# Patient Record
Sex: MALE | Race: BLACK OR AFRICAN AMERICAN | URBAN - METROPOLITAN AREA
[De-identification: names, ages, dates, MRNs, and addresses within clinical notes are randomized per-mention and may not be internally consistent; named-entity substitution may affect disease eponyms.]

---

## 2020-06-29 ENCOUNTER — APPOINTMENT (RX ONLY)
Dept: URBAN - METROPOLITAN AREA CLINIC 11 | Facility: CLINIC | Age: 55
Setting detail: DERMATOLOGY
End: 2020-06-29

## 2020-06-29 DIAGNOSIS — Z41.1 ENCOUNTER FOR COSMETIC SURGERY: ICD-10-CM

## 2020-06-29 PROCEDURE — ? PRESCRIPTION

## 2020-06-29 PROCEDURE — ? SKIN CARE REGIMEN

## 2020-06-29 PROCEDURE — ? CONSULTATION - AESTHETIC FACIAL DEFORMITY

## 2020-06-29 PROCEDURE — 99003: CPT

## 2020-06-29 RX ORDER — HYDROQUINONE 8% 8 G/100G
EMULSION TOPICAL
Qty: 1 | Refills: 4 | Status: ERX | COMMUNITY
Start: 2020-06-29

## 2020-06-29 RX ADMIN — HYDROQUINONE 8%: 8 EMULSION TOPICAL at 00:00

## 2020-06-29 ASSESSMENT — LOCATION DETAILED DESCRIPTION DERM
LOCATION DETAILED: LEFT MEDIAL INFERIOR EYELID
LOCATION DETAILED: RIGHT LATERAL INFERIOR EYELID
LOCATION DETAILED: LEFT LATERAL INFERIOR EYELID
LOCATION DETAILED: RIGHT MEDIAL INFERIOR EYELID

## 2020-06-29 ASSESSMENT — LOCATION SIMPLE DESCRIPTION DERM
LOCATION SIMPLE: LEFT INFERIOR EYELID
LOCATION SIMPLE: RIGHT INFERIOR EYELID

## 2020-06-29 ASSESSMENT — LOCATION ZONE DERM: LOCATION ZONE: EYELID

## 2020-06-29 NOTE — HPI: FACE (AESTHETIC DEFORMITY, FACE)
Is This A New Presentation, Or A Follow-Up?: Facial Aesthetic Deformity
Additional History: Patient states he saw another plastic surgeon for lower eyelid laxity and discoloration around 02/2020, at which time filler was injected into tear trough area. Patient states discoloration was worsened by filler injection. Patient states he has been using hydroquinone on the lower eyelids since the filler injections with little improvement.

## 2020-06-29 NOTE — PROCEDURE: CONSULTATION - AESTHETIC FACIAL DEFORMITY
Consultation Charge $ (Use Numbers Only, No Text Please.): 50.00
Detail Level: Zone
Send Procedure Quote As Charge: No

## 2024-04-19 ENCOUNTER — APPOINTMENT (RX ONLY)
Dept: URBAN - METROPOLITAN AREA CLINIC 11 | Facility: CLINIC | Age: 59
Setting detail: DERMATOLOGY
End: 2024-04-19

## 2024-04-19 DIAGNOSIS — Z41.1 ENCOUNTER FOR COSMETIC SURGERY: ICD-10-CM

## 2024-04-19 PROCEDURE — ? FILLERS

## 2024-04-19 PROCEDURE — Z1093: HCPCS

## 2024-04-19 PROCEDURE — ? CONSULTATION - AESTHETIC FACIAL DEFORMITY

## 2024-04-19 PROCEDURE — ? PHOTOS OBTAINED

## 2024-04-19 NOTE — HPI: FACE (AESTHETIC DEFORMITY, FACE)
How Severe Is It?: moderate
Is This A New Presentation, Or A Follow-Up?: Follow Up Facial Aesthetic Deformity
Since Your Previous Visit, Your Aesthetic Facial Deformity Is:: worse
Additional History: would like to discuss filler would like to have 1 syringe today after facial assessment

## 2024-04-19 NOTE — PROCEDURE: PHOTOS OBTAINED
Follow-Up Increment: 1
Photographed Locations: Photos were obtained of the face
Follow-Up Interval: week
Follow-Up For Additional Photos?: no
Detail Level: Zone

## 2024-04-19 NOTE — PROCEDURE: FILLERS
Mental Crease Filler Volume In Cc: 0
Administered By (Optional): Dr. Dago Lucas
Price $ (Numeric Only, No Text Please.): 885
Topical Anesthesia?: no
Injected Anesthesia In Cc: 0.5
Lot #: [id number]
Detail Level: Simple
Topical Anesthetic #1: lidocaine
Expiration Date (Month Year): 05/31/2025
Topical Anesthetic Base: cream
Consent: Verbal and written consent obtained. Risks include but not limited to bruising, beading, irregular texture, ulceration, infection, allergic reaction, scar formation, incomplete augmentation, temporary nature, procedural pain.
Map Statment: See attached map for complete details
Cheeks Filler Volume In Cc: 1
Topical Anesthetic #3: phenenylephrine
Additional Area 1 Location: lips
Filler: Leonides Gonzales (Perlane-L)
Show Price In Note?: yes
Topical Anesthetic #2: prilocaine